# Patient Record
Sex: FEMALE | Race: WHITE
[De-identification: names, ages, dates, MRNs, and addresses within clinical notes are randomized per-mention and may not be internally consistent; named-entity substitution may affect disease eponyms.]

---

## 2018-11-16 ENCOUNTER — HOSPITAL ENCOUNTER (OUTPATIENT)
Dept: HOSPITAL 76 - DI | Age: 58
Discharge: HOME | End: 2018-11-16
Attending: FAMILY MEDICINE
Payer: COMMERCIAL

## 2018-11-16 DIAGNOSIS — M51.34: Primary | ICD-10-CM

## 2018-11-16 PROCEDURE — 72070 X-RAY EXAM THORAC SPINE 2VWS: CPT

## 2018-11-16 NOTE — XRAY REPORT
Reason:  PAIN IN THORACIC SPINE

Procedure Date:  11/16/2018   

Accession Number:  [id number] / [id number]                    

Procedure:  XR  - Thoracic Spine 2 View CPT Code:  

 

FULL RESULT:

 

 

EXAM:

THORACIC SPINE RADIOGRAPHY

 

EXAM DATE: 11/16/2018 12:00 PM.

 

CLINICAL HISTORY: Pain in thoracic spine.

 

COMPARISON: None.

 

TECHNIQUE: 2 views.

 

FINDINGS:

Alignment:  No overt spondylolisthesis or scoliosis.

 

Bones: No fractures or bone lesions.

 

Disks: Mild marginal osteophytosis. Disk heights are generally maintained.

 

Soft Tissues: Normal. The visualized lungs and cardiomediastinal 

silhouette are normal.

IMPRESSION: Mild multilevel degenerative changes without overt 

compression fracture.

 

RADIA

## 2019-02-13 ENCOUNTER — HOSPITAL ENCOUNTER (EMERGENCY)
Dept: HOSPITAL 76 - ED | Age: 59
Discharge: HOME | End: 2019-02-13
Payer: MEDICAID

## 2019-02-13 VITALS — DIASTOLIC BLOOD PRESSURE: 80 MMHG | SYSTOLIC BLOOD PRESSURE: 155 MMHG

## 2019-02-13 DIAGNOSIS — F17.200: ICD-10-CM

## 2019-02-13 DIAGNOSIS — H66.001: Primary | ICD-10-CM

## 2019-02-13 DIAGNOSIS — J01.00: ICD-10-CM

## 2019-02-13 PROCEDURE — 99283 EMERGENCY DEPT VISIT LOW MDM: CPT

## 2019-02-13 NOTE — ED PHYSICIAN DOCUMENTATION
PD HPI URI





- Stated complaint


Stated Complaint: COUGHING/CONGESTION





- Chief complaint


Chief Complaint: Resp





- History obtained from


History obtained from: Patient





- History of Present Illness


Timing - onset: How many days ago (4)


Timing duration: Days (4)


Timing details: Gradual onset, Still present


Associated symptoms: Nasal congestion, Rhinorrhea, Sinus pain, Productive cough.

 No: Dyspnea


Contributing factors: No: Sick contact


Improves by: Rest, Medication


Similar symptoms before: Has not had sx before


Recently seen: Not recently seen





- Additional information


Additional information: 





58-year-old smoking female has developed cough and congestion she has some sinus

pressure and phlegm production.  She denies any shortness of breath wheezing or 

chest pain.  She has not had fever.  She does not usually cough up phlegm.





Review of Systems


Constitutional: denies: Fever


Eyes: denies: Decreased vision


Ears: denies: Ear pain


Nose: reports: Rhinorrhea / runny nose, Congestion, Sinus pressure / pain


Throat: denies: Sore throat


Cardiac: denies: Chest pain / pressure, Palpitations


Respiratory: reports: Cough.  denies: Dyspnea


GI: denies: Vomiting





PD PAST MEDICAL HISTORY





- Past Surgical History


Past Surgical History: Yes


/GYN: Tubal ligation





- Present Medications


Home Medications: 


                                Ambulatory Orders











 Medication  Instructions  Recorded  Confirmed


 


Amox/Clav 875/125 [Augmentin] 1 each PO Q12H #20 tablet 02/13/19 


 


Benzonatate [Tessalon Perle] 100 - 200 mg PO TID PRN #30 capsule 02/13/19 














- Allergies


Allergies/Adverse Reactions: 


                                    Allergies











Allergy/AdvReac Type Severity Reaction Status Date / Time


 


No Known Drug Allergies Allergy   Verified 02/13/19 09:51














- Social History


Does the pt smoke?: Yes


Smoking Status: Current every day smoker


Does the pt drink ETOH?: No


Does the pt have substance abuse?: No





- Immunizations


Immunizations are current?: Yes





PD ED PE NORMAL





- Vitals


Vital signs reviewed: Yes (hypertensive mild )





- General


General: Alert and oriented X 3, No acute distress, Well developed/nourished, 

Other (Smells heavily of tobacco.)





- HEENT


HEENT: Atraumatic, PERRL, EOMI, Pharynx benign, Other (Right TM is inflamed in 

the basement with disortion of the landmarks. The left is clear. There is sinus 

point tenderness to the left maxillary sinus and the right is non-tender. )





- Neck


Neck: Supple, no meningeal sign, No bony TTP





- Cardiac


Cardiac: RRR, No murmur





- Respiratory


Respiratory: No respiratory distress, Other (scattered wheezes low pitched with 

good air movement. )





- Abdomen


Abdomen: Soft, Non tender





- Back


Back: No CVA TTP, No spinal TTP





- Derm


Derm: Normal color, Warm and dry, No rash





- Extremities


Extremities: No deformity, No edema





- Neuro


Neuro: Alert and oriented X 3, CNs 2-12 intact, No motor deficit, No sensory 

deficit, Normal speech


Eye Opening: Spontaneous


Motor: Obeys Commands


Verbal: Oriented


GCS Score: 15





- Psych


Psych: Normal mood, Normal affect





Results





- Vitals


Vitals: 





                               Vital Signs - 24 hr











  02/13/19





  09:49


 


Temperature 36.2 C L


 


Heart Rate 90


 


Respiratory 18





Rate 


 


Blood Pressure 132/84 H


 


O2 Saturation 94








                                     Oxygen











O2 Source                      Room air

















PD MEDICAL DECISION MAKING





- ED course


Complexity details: considered differential, d/w patient


ED course: 





58-year-old female with productive cough has otitis and sinusitis on exam she is

administered dexamethasone 10 mg orally we will place her on some Augmentin and 

provide some Tessalon Perles.





Departure





- Departure


Disposition: 01 Home, Self Care


Clinical Impression: 


Otitis media


Qualifiers:


 Otitis media type: suppurative Chronicity: acute Laterality: right Recurrence: 

not specified as recurrent Spontaneous tympanic membrane rupture: without 

spontaneous rupture Qualified Code(s): H66.001 - Acute suppurative otitis media 

without spontaneous rupture of ear drum, right ear





Sinusitis


Qualifiers:


 Sinusitis location: maxillary Chronicity: acute Recurrence: not specified as 

recurrent Qualified Code(s): J01.00 - Acute maxillary sinusitis, unspecified





Condition: Stable


Instructions:  ED Sinusitis Abx Tx, ED Otitis Media Acute Adult


Follow-Up: 


[facility] [Provider Group]


Prescriptions: 


Amox/Clav 875/125 [Augmentin] 1 each PO Q12H #20 tablet


Benzonatate [Tessalon Perle] 100 - 200 mg PO TID PRN #30 capsule


 PRN Reason: Cough

## 2019-06-03 ENCOUNTER — HOSPITAL ENCOUNTER (OUTPATIENT)
Dept: HOSPITAL 76 - DI | Age: 59
Discharge: HOME | End: 2019-06-03
Attending: NURSE PRACTITIONER
Payer: MEDICAID

## 2019-06-03 DIAGNOSIS — J43.9: ICD-10-CM

## 2019-06-03 DIAGNOSIS — R91.1: ICD-10-CM

## 2019-06-03 DIAGNOSIS — F17.210: Primary | ICD-10-CM

## 2019-06-03 NOTE — CT REPORT
Reason:  NICOTINE DEPENDENCY

Procedure Date:  06/03/2019   

Accession Number:  [id number] / [id number]                    

Procedure:  CT  - Low Dose Lung Cancer Screen CPT Code:  

 

FULL RESULT:

 

 

EXAM

CT LUNG SCREEN

 

EXAM DATE: 6/3/2019 08:35 AM.

 

HISTORY: 58-year-old patient with 30-pack-year smoking history. Currently 

smoking: Yes.

 

COMPARISON: None.

 

TECHNIQUE: CT examination of the entire thorax without contrast was 

performed using low-dose technique. Thin section coronal, axial, sagittal 

and MIP axial images were obtained.

 

In accordance with CT protocol optimization, one or more of the following 

dose reduction techniques were utilized for this exam: automated exposure 

control, adjustment of mA and/or KV based on patient size, or use of 

iterative reconstructive technique.

 

FINDINGS:

 

Nodules:

Right upper lobe: None.

Right middle lobe: None.

Right lower lobe: 3 mm fissure based nodule image 105 series 5.

Left upper lobe: None.

Left lower lobe: None.

 

Emphysema: Mild.

Pleura: Right apical pleural thickening, 0.4 thick and 2.1 cm wide on 

image 50 series 7.

Aorta: Unremarkable.

Mediastinum: Unremarkable.

Coronary calcifications: None.

Other pulmonary findings: None.

Other extrapulmonary findings: None.

IMPRESSION:

Lung-RADS ASSESSMENT CATEGORY: 2 - benign appearance.

 

Probability of malignancy: Less than 1%

 

RECOMMENDATION:

Continue annual screening low-dose chest CT as per lung RADS guidelines.

 

RADIA

## 2019-10-10 ENCOUNTER — HOSPITAL ENCOUNTER (EMERGENCY)
Dept: HOSPITAL 76 - ED | Age: 59
Discharge: HOME | End: 2019-10-10
Payer: MEDICAID

## 2019-10-10 VITALS — DIASTOLIC BLOOD PRESSURE: 75 MMHG | SYSTOLIC BLOOD PRESSURE: 136 MMHG

## 2019-10-10 DIAGNOSIS — F17.200: ICD-10-CM

## 2019-10-10 DIAGNOSIS — J06.9: Primary | ICD-10-CM

## 2019-10-10 PROCEDURE — 99283 EMERGENCY DEPT VISIT LOW MDM: CPT

## 2019-10-10 NOTE — ED PHYSICIAN DOCUMENTATION
PD HPI URI





- Stated complaint


Stated Complaint: COUGH





- Chief complaint


Chief Complaint: Resp





- History obtained from


History obtained from: Patient





- History of Present Illness


Timing - onset: How many days ago (5)


Timing duration: Days (5)


Timing details: Abrupt onset, Still present (worsening)


Associated symptoms: Chills, Sinus pain, Sore throat, Productive cough.  No: 

Fever, Chest pain, Dyspnea, NVD


Contributing factors: No: Sick contact, COPD / asthma


Improves by: No: Medication (cough meds and Ricola)


Worsened by: Activity


Recently seen: Not recently seen





Review of Systems


Constitutional: reports: Chills, Myalgias.  denies: Fever


Nose: reports: Congestion.  denies: Rhinorrhea / runny nose


Throat: denies: Sore throat


Cardiac: denies: Chest pain / pressure


Respiratory: reports: Dyspnea, Cough.  denies: Wheezing


GI: denies: Nausea, Vomiting, Diarrhea


Skin: denies: Rash





PD PAST MEDICAL HISTORY





- Past Medical History


Past Medical History: No


Cardiovascular: None


Respiratory: None





- Past Surgical History


Past Surgical History: Yes


/GYN: Tubal ligation





- Present Medications


Home Medications: 


                                Ambulatory Orders











 Medication  Instructions  Recorded  Confirmed


 


Amox/Clav 875/125 [Augmentin] 1 each PO Q12H #20 tablet 02/13/19 


 


Benzonatate [Tessalon Perle] 100 - 200 mg PO TID PRN #30 capsule 02/13/19 


 


Benzonatate [Tessalon Perle] 100 - 200 mg PO TID PRN #30 capsule 10/10/19 


 


Doxycycline Hyclate 100 mg PO BID #20 capsule 10/10/19 


 


dexAMETHasone [Decadron] 4 mg PO DAILY #7 tablet 10/10/19 


 


guaiFENesin/CODEINE [Robitussin AC] 10 ml PO Q6H PRN #240 ml 10/10/19 














- Allergies


Allergies/Adverse Reactions: 


                                    Allergies











Allergy/AdvReac Type Severity Reaction Status Date / Time


 


No Known Drug Allergies Allergy   Verified 02/13/19 09:51














- Living Situation


Living Situation: reports: With spouse/s.o.


Living Arrangement: reports: At home





- Social History


Does the pt smoke?: Yes


Smoking Status: Current every day smoker


Does the pt drink ETOH?: No


Does the pt have substance abuse?: No





- Immunizations


Immunizations are current?: Yes





PD ED PE NORMAL





- Vitals


Vital signs reviewed: Yes





- General


General: Alert and oriented X 3, No acute distress, Well developed/nourished





- HEENT


HEENT: Ears normal, Pharynx benign





- Neck


Neck: Supple, no meningeal sign, No adenopathy





- Cardiac


Cardiac: RRR, No murmur





- Respiratory


Respiratory: No respiratory distress, Clear bilaterally





- Derm


Derm: Normal color, Warm and dry





- Extremities


Extremities: No edema, No calf tenderness / cord





- Neuro


Neuro: Alert and oriented X 3, No motor deficit, Normal speech





Results





- Vitals


Vitals: 


                               Vital Signs - 24 hr











  10/10/19





  07:45


 


Temperature 36.6 C


 


Heart Rate 71


 


Respiratory 17





Rate 


 


Blood Pressure 136/75 H


 


O2 Saturation 97








                                     Oxygen











O2 Source                      Room air

















PD MEDICAL DECISION MAKING





- ED course


Complexity details: considered differential (URI symptoms which have persisted 

and now with bronchitis type symptoms), d/w patient





Departure





- Departure


Disposition: 01 Home, Self Care


Clinical Impression: 


Upper respiratory infection


Qualifiers:


 URI type: unspecified URI Qualified Code(s): J06.9 - Acute upper respiratory 

infection, unspecified





Condition: Stable


Record reviewed to determine appropriate education?: Yes


Instructions:  ED Upper Resp Infec Abx Tx


Follow-Up: 


Tyesha Shi DNP [Primary Care Provider] - 


Prescriptions: 


Benzonatate [Tessalon Perle] 100 - 200 mg PO TID PRN #30 capsule


 PRN Reason: Cough


dexAMETHasone [Decadron] 4 mg PO DAILY #7 tablet


Doxycycline Hyclate 100 mg PO BID #20 capsule


guaiFENesin/CODEINE [Robitussin AC] 10 ml PO Q6H PRN #240 ml


 PRN Reason: Cough


Comments: 


Stay well-hydrated.  Tylenol or ibuprofen if needed for pains and fevers.  Use 

benzonatate as needed for cough suppression.  Continue lozenges as needed as 

well.





These symptoms commonly are from viral illnesses and we treat the symptoms with 

Decadron steroid for inflammation of the sinus and airway.  This will help quite

a bit.  Add codeine cough medicine if needed.





Some of your description may suggest bacterial infection so we can add an 

antibiotic as well doxycycline twice daily for a week.





Recheck if not improving well over the next several days and return if 

worsening. You may have some persistent cough and a little congestion for even a

week or 2 afterward but generally be feeling better


Discharge Date/Time: 10/10/19 08:47

## 2021-10-19 ENCOUNTER — HOSPITAL ENCOUNTER (OUTPATIENT)
Dept: HOSPITAL 76 - LAB.N | Age: 61
Discharge: HOME | End: 2021-10-19
Attending: FAMILY MEDICINE
Payer: MEDICAID

## 2021-10-19 DIAGNOSIS — J06.9: Primary | ICD-10-CM

## 2021-10-19 DIAGNOSIS — Z20.822: ICD-10-CM

## 2021-12-16 ENCOUNTER — HOSPITAL ENCOUNTER (OUTPATIENT)
Dept: HOSPITAL 76 - LAB.N | Age: 61
Discharge: HOME | End: 2021-12-16
Attending: PHYSICIAN ASSISTANT
Payer: MEDICAID

## 2021-12-16 DIAGNOSIS — Z20.822: ICD-10-CM

## 2021-12-16 DIAGNOSIS — R05.9: Primary | ICD-10-CM

## 2023-06-01 ENCOUNTER — HOSPITAL ENCOUNTER (OUTPATIENT)
Dept: HOSPITAL 76 - LAB.N | Age: 63
Discharge: HOME | End: 2023-06-01
Attending: NURSE PRACTITIONER
Payer: MEDICAID

## 2023-06-01 ENCOUNTER — HOSPITAL ENCOUNTER (OUTPATIENT)
Dept: HOSPITAL 76 - DI | Age: 63
Discharge: HOME | End: 2023-06-01
Attending: NURSE PRACTITIONER
Payer: MEDICAID

## 2023-06-01 DIAGNOSIS — F17.200: ICD-10-CM

## 2023-06-01 DIAGNOSIS — Z00.00: Primary | ICD-10-CM

## 2023-06-01 DIAGNOSIS — R03.0: ICD-10-CM

## 2023-06-01 DIAGNOSIS — Z78.0: ICD-10-CM

## 2023-06-01 DIAGNOSIS — M85.89: ICD-10-CM

## 2023-06-01 DIAGNOSIS — K80.20: ICD-10-CM

## 2023-06-01 DIAGNOSIS — Z00.00: ICD-10-CM

## 2023-06-01 DIAGNOSIS — Z12.2: Primary | ICD-10-CM

## 2023-06-01 LAB
ALBUMIN DIAFP-MCNC: 4.4 G/DL (ref 3.2–5.5)
ALBUMIN/GLOB SERPL: 1.4 {RATIO} (ref 1–2.2)
ALP SERPL-CCNC: 80 IU/L (ref 42–121)
ALT SERPL W P-5'-P-CCNC: 15 IU/L (ref 10–60)
ANION GAP SERPL CALCULATED.4IONS-SCNC: 6 MMOL/L (ref 6–13)
AST SERPL W P-5'-P-CCNC: 21 IU/L (ref 10–42)
BASOPHILS NFR BLD AUTO: 0 10^3/UL (ref 0–0.1)
BASOPHILS NFR BLD AUTO: 0.9 %
BILIRUB BLD-MCNC: 0.4 MG/DL (ref 0.2–1)
BUN SERPL-MCNC: 12 MG/DL (ref 6–20)
CALCIUM UR-MCNC: 9.3 MG/DL (ref 8.5–10.3)
CHLORIDE SERPL-SCNC: 106 MMOL/L (ref 101–111)
CHOLEST SERPL-MCNC: 184 MG/DL
CO2 SERPL-SCNC: 27 MMOL/L (ref 21–32)
CREAT SERPLBLD-SCNC: 0.7 MG/DL (ref 0.4–1)
EOSINOPHIL # BLD AUTO: 0.1 10^3/UL (ref 0–0.7)
EOSINOPHIL NFR BLD AUTO: 2.8 %
ERYTHROCYTE [DISTWIDTH] IN BLOOD BY AUTOMATED COUNT: 13.2 % (ref 12–15)
EST. AVERAGE GLUCOSE BLD GHB EST-MCNC: 108 MG/DL (ref 70–100)
GFRSERPLBLD MDRD-ARVRAT: 85 ML/MIN/{1.73_M2} (ref 89–?)
GLOBULIN SER-MCNC: 3.2 G/DL (ref 2.1–4.2)
GLUCOSE SERPL-MCNC: 103 MG/DL (ref 70–100)
HBA1C MFR BLD HPLC: 5.4 % (ref 4.27–6.07)
HCT VFR BLD AUTO: 42.3 % (ref 37–47)
HDLC SERPL-MCNC: 52 MG/DL
HDLC SERPL: 3.5 {RATIO} (ref ?–4.4)
HGB UR QL STRIP: 14 G/DL (ref 12–16)
LDLC SERPL CALC-MCNC: 99 MG/DL
LDLC/HDLC SERPL: 1.9 {RATIO} (ref ?–4.4)
LYMPHOCYTES # SPEC AUTO: 1.7 10^3/UL (ref 1.5–3.5)
LYMPHOCYTES NFR BLD AUTO: 37.2 %
MCH RBC QN AUTO: 31.5 PG (ref 27–31)
MCHC RBC AUTO-ENTMCNC: 33.1 G/DL (ref 32–36)
MCV RBC AUTO: 95.1 FL (ref 81–99)
MONOCYTES # BLD AUTO: 0.3 10^3/UL (ref 0–1)
MONOCYTES NFR BLD AUTO: 7.3 %
NEUTROPHILS # BLD AUTO: 2.4 10^3/UL (ref 1.5–6.6)
NEUTROPHILS # SNV AUTO: 4.7 X10^3/UL (ref 4.8–10.8)
NEUTROPHILS NFR BLD AUTO: 51.6 %
NRBC # BLD AUTO: 0 /100WBC
NRBC # BLD AUTO: 0 X10^3/UL
PDW BLD AUTO: 8.9 FL (ref 7.9–10.8)
PLATELET # BLD: 213 10^3/UL (ref 130–450)
POTASSIUM SERPL-SCNC: 4.6 MMOL/L (ref 3.5–5)
PROT SPEC-MCNC: 7.6 G/DL (ref 6.7–8.2)
RBC MAR: 4.45 10^6/UL (ref 4.2–5.4)
SODIUM SERPLBLD-SCNC: 139 MMOL/L (ref 135–145)
TRIGL P FAST SERPL-MCNC: 167 MG/DL
TSH SERPL-ACNC: 1.76 UIU/ML (ref 0.34–5.6)
VLDLC SERPL-SCNC: 33 MG/DL

## 2023-06-01 PROCEDURE — 83036 HEMOGLOBIN GLYCOSYLATED A1C: CPT

## 2023-06-01 PROCEDURE — 80053 COMPREHEN METABOLIC PANEL: CPT

## 2023-06-01 PROCEDURE — 84443 ASSAY THYROID STIM HORMONE: CPT

## 2023-06-01 PROCEDURE — 36415 COLL VENOUS BLD VENIPUNCTURE: CPT

## 2023-06-01 PROCEDURE — 83721 ASSAY OF BLOOD LIPOPROTEIN: CPT

## 2023-06-01 PROCEDURE — 80061 LIPID PANEL: CPT

## 2023-06-01 NOTE — CT REPORT
PROCEDURE:  Low Dose Lung Cancer Screen

 

INDICATIONS:  NICOTINE DEPENDENCY, POSTMENOPAUSAL

 

TECHNIQUE:  

Noncontrast low-dose axial images were acquired from the pulmonary apices to the posterior costophren
ic angles. Multiplanar MIP reformats were then reconstructed. For radiation dose reduction, the follo
wing was used: automated exposure control, adjustment of mA and/or kV according to patient size.

 

COMPARISON:  6/3/2019

 

FINDINGS:  

Image quality:  Excellent.  

 

Prior cancer history: Unsure

 

Lungs and pleura: No pleural effusions. No  pneumothorax. No suspicious pulmonary nodules which requi
re follow up. Mild bilateral lower lobe and right middle lobe bronchial wall thickening without bronc
hiectasis. Tiny patchy consolidations present centrally in the right middle lobe. 

 

Mediastinum: Heart size is normal. No pericardial effusions. No mediastinal adenopathy by size criter
ia. No large vessel abnormality. Normal esophagus with a tiny hiatal hernia.

 

Chest wall and lower neck: Thyroid is unremarkable. No axillary or supraclavicular adenopathy by size
. 

 

Bones: No aggressive osseous abnormality.

 

Upper Abdomen: Small calcification within the gallbladder. Upper abdominal views are otherwise normal
.

 

IMPRESSION:

 

Lung RAD: 1 - Negative.

Recommendation: Continue annual screening in 12 Months with LDCT

 

Non-Lung Significant Findings: None. 

 

Mild bronchial wall thickening consistent with COPD history.

 

Cholelithiasis.

 

Reviewed by: Melly Ulloa MD on 6/1/2023 9:51 PM MONICA

Approved by: Melly Ulloa MD on 6/1/2023 9:51 PM MONICA

 

 

Station ID:  [id number]

Lung-Significant-Findings

## 2023-06-01 NOTE — DEXA REPORT
PROCEDURE: Dexa Spine and/or Hip

 

INDICATIONS: NICOTINE DEPENDENCY, POSTMENOPAUSAL

 

TECHNIQUE: Dual energy x-ray absorptiometry (DXA) was performed on a [employer] System.  Regions measur
ed are the AP Spine, femoral neck, and if needed forearm.

 

COMPARISON: None

  

FINDINGS:

 

Lumbar Spine: 

Bone Mineral Density 1.050 g/cm/cm,T score  -1.1. Osteopenia.

 

Left Femoral Neck:

Bone Mineral Density 0.848 g/cm/cm, T score -1.4. Osteopenia.

 

Left Hip:

Bone Mineral Density 0.884 g/cm/cm,T score -1.0. Normal.

 

(T score greater or equal to -1.0: NORMAL)

(T score from -1.1 to -2.4: OSTEOPENIA)

(T score less than or equal to -2.5 to: OSTEOPOROSIS)

 

Impression: 

By WHO criteria, this patient has low bone density (osteopenia). 

 

Patients with diagnosis of osteoporosis or osteopenia should have regular bone mineral density assess
ment.  For those eligible for Medicare, routine testing is allowed once every 2 years.  Testing frequ
ency can be increased for patients who have rapidly progressing disease or for those who are receivin
g medical therapy to restore bone mass.

 

Reviewed by: Nelson Huynh MD on 6/1/2023 3:27 PM PDT

Approved by: Nelson Huynh MD on 6/1/2023 3:27 PM PDT

 

 

Station ID:  [id number]